# Patient Record
Sex: MALE | Race: BLACK OR AFRICAN AMERICAN | NOT HISPANIC OR LATINO | Employment: OTHER | URBAN - METROPOLITAN AREA
[De-identification: names, ages, dates, MRNs, and addresses within clinical notes are randomized per-mention and may not be internally consistent; named-entity substitution may affect disease eponyms.]

---

## 2023-12-14 ENCOUNTER — OFFICE VISIT (OUTPATIENT)
Dept: PRIMARY CARE | Facility: CLINIC | Age: 88
End: 2023-12-14
Payer: MEDICARE

## 2023-12-14 ENCOUNTER — ANCILLARY PROCEDURE (OUTPATIENT)
Dept: RADIOLOGY | Facility: CLINIC | Age: 88
End: 2023-12-14
Payer: MEDICARE

## 2023-12-14 VITALS
BODY MASS INDEX: 23.05 KG/M2 | OXYGEN SATURATION: 96 % | DIASTOLIC BLOOD PRESSURE: 74 MMHG | HEART RATE: 80 BPM | WEIGHT: 161 LBS | HEIGHT: 70 IN | SYSTOLIC BLOOD PRESSURE: 132 MMHG

## 2023-12-14 DIAGNOSIS — G30.1 LATE ONSET ALZHEIMER'S DEMENTIA WITHOUT BEHAVIORAL DISTURBANCE (MULTI): ICD-10-CM

## 2023-12-14 DIAGNOSIS — F02.80 LATE ONSET ALZHEIMER'S DEMENTIA WITHOUT BEHAVIORAL DISTURBANCE (MULTI): ICD-10-CM

## 2023-12-14 DIAGNOSIS — H61.23 IMPACTED CERUMEN OF BOTH EARS: ICD-10-CM

## 2023-12-14 DIAGNOSIS — I10 HYPERTENSION, UNSPECIFIED TYPE: ICD-10-CM

## 2023-12-14 DIAGNOSIS — Z74.2: ICD-10-CM

## 2023-12-14 DIAGNOSIS — J18.9 PNEUMONIA DUE TO INFECTIOUS ORGANISM, UNSPECIFIED LATERALITY, UNSPECIFIED PART OF LUNG: ICD-10-CM

## 2023-12-14 DIAGNOSIS — Z74.2 ASSISTANCE NEEDED AT HOME: ICD-10-CM

## 2023-12-14 DIAGNOSIS — Z00.00 MEDICARE ANNUAL WELLNESS VISIT, SUBSEQUENT: Primary | Chronic | ICD-10-CM

## 2023-12-14 DIAGNOSIS — R13.10 DYSPHAGIA, UNSPECIFIED TYPE: ICD-10-CM

## 2023-12-14 DIAGNOSIS — R09.82 POST-NASAL DRIP: ICD-10-CM

## 2023-12-14 PROCEDURE — 1160F RVW MEDS BY RX/DR IN RCRD: CPT | Performed by: INTERNAL MEDICINE

## 2023-12-14 PROCEDURE — 71046 X-RAY EXAM CHEST 2 VIEWS: CPT | Performed by: STUDENT IN AN ORGANIZED HEALTH CARE EDUCATION/TRAINING PROGRAM

## 2023-12-14 PROCEDURE — 1036F TOBACCO NON-USER: CPT | Performed by: INTERNAL MEDICINE

## 2023-12-14 PROCEDURE — 1126F AMNT PAIN NOTED NONE PRSNT: CPT | Performed by: INTERNAL MEDICINE

## 2023-12-14 PROCEDURE — 1159F MED LIST DOCD IN RCRD: CPT | Performed by: INTERNAL MEDICINE

## 2023-12-14 PROCEDURE — 1170F FXNL STATUS ASSESSED: CPT | Performed by: INTERNAL MEDICINE

## 2023-12-14 PROCEDURE — G0439 PPPS, SUBSEQ VISIT: HCPCS | Performed by: INTERNAL MEDICINE

## 2023-12-14 PROCEDURE — 3078F DIAST BP <80 MM HG: CPT | Performed by: INTERNAL MEDICINE

## 2023-12-14 PROCEDURE — 3075F SYST BP GE 130 - 139MM HG: CPT | Performed by: INTERNAL MEDICINE

## 2023-12-14 PROCEDURE — 71046 X-RAY EXAM CHEST 2 VIEWS: CPT | Mod: FY

## 2023-12-14 RX ORDER — AMLODIPINE BESYLATE 5 MG/1
5 TABLET ORAL DAILY
Qty: 90 TABLET | Refills: 3 | Status: SHIPPED | OUTPATIENT
Start: 2023-12-14 | End: 2024-06-11

## 2023-12-14 RX ORDER — FLUTICASONE PROPIONATE 50 MCG
1 SPRAY, SUSPENSION (ML) NASAL DAILY
Qty: 16 G | Refills: 5 | Status: SHIPPED | OUTPATIENT
Start: 2023-12-14 | End: 2024-12-13

## 2023-12-14 ASSESSMENT — ACTIVITIES OF DAILY LIVING (ADL)
DRESSING: INDEPENDENT
MANAGING_FINANCES: NEEDS ASSISTANCE
TAKING_MEDICATION: NEEDS ASSISTANCE
BATHING: INDEPENDENT
DOING_HOUSEWORK: NEEDS ASSISTANCE
GROCERY_SHOPPING: NEEDS ASSISTANCE

## 2023-12-14 ASSESSMENT — ENCOUNTER SYMPTOMS: CONSTITUTIONAL NEGATIVE: 1

## 2023-12-14 ASSESSMENT — PATIENT HEALTH QUESTIONNAIRE - PHQ9
1. LITTLE INTEREST OR PLEASURE IN DOING THINGS: NOT AT ALL
SUM OF ALL RESPONSES TO PHQ9 QUESTIONS 1 AND 2: 0
2. FEELING DOWN, DEPRESSED OR HOPELESS: NOT AT ALL

## 2023-12-14 NOTE — PROGRESS NOTES
"Subjective   Reason for Visit: Tom Rodriguez is an 88 y.o. male here for a Medicare Wellness visit.     Past Medical, Surgical, and Family History reviewed and updated in chart.    Reviewed all medications by prescribing practitioner or clinical pharmacist (such as prescriptions, OTCs, herbal therapies and supplements) and documented in the medical record.  HPI      Patient recently seen in the emergency department for cough  Noted to have pneumonia prescribed antibiotic he finished it      He is having a significant amount of postnasal drip and cough  It appears to me that he is congested with the nasal congestion and stuffiness      He also needs assistance at home daughter wants a  referral for home health aide      He is also having difficulty hearing he is having impaction of both ears with solid vacs no pain      Patient Care Team:  Chioma Correa MD as PCP - General (Internal Medicine)  Chioma Correa MD as PCP - [employer] PCP     Review of Systems   Constitutional: Negative.    Gastrointestinal:         DIFFICULTY SWALLOWING   MORE OF A POST NASAL DRIP    All other systems reviewed and are negative.      Objective   Vitals:  /74   Pulse 80   Ht 1.765 m (5' 9.5\")   Wt 73 kg (161 lb)   SpO2 96%   BMI 23.43 kg/m²       Physical Exam  Vitals and nursing note reviewed.   Constitutional:       Comments: THICK MUCUS AT THE BACK   OF THE THROAT    HENT:      Ears:      Comments: IMPACTED CERUMEN BOTH EARS      Mouth/Throat:      Comments: THICK MUCUS AT THE BACK OF THE THROAT         Assessment/Plan   Problem List Items Addressed This Visit       Late onset Alzheimer's dementia without behavioral disturbance (CMS/[facility])    Current Assessment & Plan     STABLE         Impacted cerumen of both ears    Relevant Orders    Referral to ENT     Other Visit Diagnoses       Medicare annual wellness visit, subsequent  (Chronic)   -  Primary    Dysphagia, unspecified type     "    Relevant Orders    FL GI esophagram    Post-nasal drip        Relevant Medications    fluticasone (Flonase) 50 mcg/actuation nasal spray    Pneumonia due to infectious organism, unspecified laterality, unspecified part of lung        Relevant Orders    XR chest 2 views    Hypertension, unspecified type        Relevant Medications    amLODIPine (Norvasc) 5 mg tablet    No assistance at home        Relevant Orders    Referral to Social Work    Assistance needed at home        Relevant Orders    Referral to Social Work                 A/P       WARM SALT WATER GURGLE   ADEQUATE AMOUNT OF FLUID   FLONASE NASAL SPRAY   BARIUM SWALLOW ESOPHAGUS  REFFERAL ENT   REFFERAL    AMLODIPINE 5MG 1 PILL EVERY DAY FILLED   CHEST X-RAY TO REPEAT IN 1 MONTH TIME

## 2024-01-05 ENCOUNTER — PATIENT OUTREACH (OUTPATIENT)
Dept: CARE COORDINATION | Facility: CLINIC | Age: 89
End: 2024-01-05
Payer: MEDICARE

## 2024-01-05 ENCOUNTER — DOCUMENTATION (OUTPATIENT)
Dept: CARE COORDINATION | Facility: CLINIC | Age: 89
End: 2024-01-05
Payer: MEDICARE

## 2024-01-05 SDOH — ECONOMIC STABILITY: FOOD INSECURITY: WITHIN THE PAST 12 MONTHS, YOU WORRIED THAT YOUR FOOD WOULD RUN OUT BEFORE YOU GOT MONEY TO BUY MORE.: SOMETIMES TRUE

## 2024-01-05 SDOH — ECONOMIC STABILITY: GENERAL: WOULD YOU LIKE HELP WITH ANY OF THE FOLLOWING NEEDS?: FOOD INSECURITY;OTHER

## 2024-01-05 ASSESSMENT — SOCIAL DETERMINANTS OF HEALTH (SDOH)
HOW OFTEN DO YOU GET TOGETHER WITH FRIENDS OR RELATIVES?: TWICE A WEEK
IN A TYPICAL WEEK, HOW MANY TIMES DO YOU TALK ON THE PHONE WITH FAMILY, FRIENDS, OR NEIGHBORS?: TWICE A WEEK

## 2024-01-05 NOTE — PROGRESS NOTES
Referral sent via voicemail to [employer] under [employer] for help with ADLS and IADLS.  Contact is daughter/[place].  Cande Gutierrez MSW LSW   1/5/24.

## 2024-01-05 NOTE — CARE PLAN
This  was referred this patient due to patient needing assistance in the home.  Contact is daughter/Corinne @ [phone].  Message has been left for Corinne by this  today.  Cande Gutierrez MSW LSW   1/5/24

## 2024-01-05 NOTE — CARE PLAN
This  has spoken to the daughter of the patient/Corinne.   Corinne has explained to this  that the patient needs assistance in his home/adls and ialds.  Family is helping with cooking, cleaning, and errands. Patient uses a cane, has limited hearing and eye sight.  Patient does live alone, but has family support.  We discussed:  [employer]/PASSPORT Program/Corinne has agreed to referral  [facility]/at this time, we are not referring/but it can be an option.   Care Plan is place  Cande Gutierrez MSW LSW   1/5/24   Problem: Coordination of Community Resources Needed  Goal: Coordination of Services will be Obtained  Outcome: Progressing

## 2024-01-31 ENCOUNTER — TELEPHONE (OUTPATIENT)
Dept: PRIMARY CARE | Facility: CLINIC | Age: 89
End: 2024-01-31
Payer: MEDICARE

## 2024-01-31 NOTE — TELEPHONE ENCOUNTER
Shanthi from [facility] is asking for the physician's statement form that they have sent over. She can be reached at [phone]

## 2024-02-01 ENCOUNTER — APPOINTMENT (OUTPATIENT)
Dept: RADIOLOGY | Facility: HOSPITAL | Age: 89
End: 2024-02-01
Payer: MEDICARE

## 2024-02-01 ENCOUNTER — PATIENT OUTREACH (OUTPATIENT)
Dept: CARE COORDINATION | Facility: CLINIC | Age: 89
End: 2024-02-01
Payer: MEDICARE

## 2024-02-01 NOTE — CARE PLAN
This  has spoken to Corinne/Daughter of patient/Tom Rodriguez to obtain an update on PASSPORT Services.  Corinne informed this  that patient was assessed last Monday for [employer]port Services. Corinne would like an update from [facility]/PASSPORT on patient's status.  At this time, this  has called [employer] and left a message on their status line for services and asked them to contact myself and the patient's daughter for updates.  Cande Gutierrez MSW LSW   2/1/24

## 2024-02-02 ENCOUNTER — DOCUMENTATION (OUTPATIENT)
Dept: CARE COORDINATION | Facility: CLINIC | Age: 89
End: 2024-02-02
Payer: MEDICARE

## 2024-02-02 NOTE — PROGRESS NOTES
This  received a message from [employer] [facility]. Rosanne Mohr works for [employer]SP[employer] and informed this  that the she has spoken to patient's daughter and updated her.  The updates include:  Patient was assessed on 1/22/24 for PASSPORT  Patient was applied to Medicaid with local [employer] on 1/31/24; it takes 30-45 days to process.    At this time, patient is in process with Medicaid and awaiting an approval for the waiver program.    Cande Gutierrez MSW LSW   2/2/24.

## 2024-03-12 ENCOUNTER — PATIENT OUTREACH (OUTPATIENT)
Dept: CARE COORDINATION | Facility: CLINIC | Age: 89
End: 2024-03-12
Payer: MEDICARE

## 2024-03-12 NOTE — CARE PLAN
This SW left message for [place]/dtr of patient for an update re: assessment/[employer].  At this time, this SW is waiting for a return call.  Cande Gutierrez MSW LSW  3/12/24

## 2024-03-13 ENCOUNTER — DOCUMENTATION (OUTPATIENT)
Dept: CARE COORDINATION | Facility: CLINIC | Age: 89
End: 2024-03-13
Payer: MEDICARE

## 2024-03-13 NOTE — PROGRESS NOTES
This SW spoke with Dtr of patient today.  Dtr has not heard from [employer] for her father/the patient.  This SW will call [employer] and ask about patient's application.  Cande Gutierrez MSW LSW   3/13/24.

## 2024-03-14 ENCOUNTER — DOCUMENTATION (OUTPATIENT)
Dept: CARE COORDINATION | Facility: CLINIC | Age: 89
End: 2024-03-14
Payer: MEDICARE

## 2024-03-14 NOTE — PROGRESS NOTES
This  attempted to reach [employer] and leave a message for an update regarding the status of the patient's application for [employer]SPORT. Their mailbox is full at [place]. This  then sent an email message through their website asking for an update re: Patient's status.   This  is waiting for a response. NOTE: This SW did ask [facility] to contact patient's dtr too.  Cande Gutierrez MSW LSW   3/14/24

## 2024-04-03 ENCOUNTER — PATIENT OUTREACH (OUTPATIENT)
Dept: CARE COORDINATION | Facility: CLINIC | Age: 89
End: 2024-04-03
Payer: MEDICARE

## 2024-04-03 NOTE — CARE PLAN
This SW left a message for Corinne/Pt's daughter asking if she heard from [employer].  Cande Gutierrez MSW LSW  4/3/24

## 2024-05-03 ENCOUNTER — PATIENT OUTREACH (OUTPATIENT)
Dept: CARE COORDINATION | Facility: CLINIC | Age: 89
End: 2024-05-03
Payer: MEDICARE

## 2024-05-03 NOTE — CARE PLAN
SW spoke with Pt's dtr/Corinne.  Corinne has not heard from [employer].  SW has called [employer](LEFT MESSAGE) and sent another referral web today too.  Cande Gutierrez MSW LSW

## 2024-05-10 ENCOUNTER — DOCUMENTATION (OUTPATIENT)
Dept: CARE COORDINATION | Facility: CLINIC | Age: 89
End: 2024-05-10
Payer: MEDICARE

## 2024-05-10 NOTE — PROGRESS NOTES
"Dtr/Corinne called and asked about Passport. SW never heard back from [employer]. SW did attempt again on the phone with [employer]/hold was long .  Email was sent to: Kenneth Brown at [employer]\"Hi,  A patient was referred for PASSPORT SERVICES: Tom Rodriguez.  Corinne is his daughter.  I have been trying to attempt to obtain the status of this Pt's application. Pt's daughter really needs help with her father.   I also completed an online trino for this service recently. I would like to know if someone from your agency can reach out to the daughter?  I have also attempted to wait online on your agency's number. I could not wait a long time.       ADDRESS OF Patient.    [address] [place] [address] [address] [place] [address]     35 is the   [place]'s number is: [phone].     Please let me know if you can assist.     Thank you.\"    DTR updated on the above email.  Cande Gutierrez MSW LSW     "

## 2024-05-28 ENCOUNTER — PATIENT OUTREACH (OUTPATIENT)
Dept: CARE COORDINATION | Facility: CLINIC | Age: 89
End: 2024-05-28
Payer: MEDICARE

## 2024-05-28 NOTE — PROGRESS NOTES
SW spoke with Corinne the daughter. Pt will be assessed this week for PASSPORT Services.   If Pt needs more help, Dtr has been explained to ask for [employer] Team.  Cande Gutierrez MSW LSW

## 2024-12-19 ENCOUNTER — APPOINTMENT (OUTPATIENT)
Dept: PRIMARY CARE | Facility: CLINIC | Age: 89
End: 2024-12-19

## 2025-01-17 ENCOUNTER — APPOINTMENT (OUTPATIENT)
Dept: PRIMARY CARE | Facility: CLINIC | Age: OVER 89
End: 2025-01-17
Payer: MEDICARE

## 2025-06-02 ENCOUNTER — APPOINTMENT (OUTPATIENT)
Dept: PRIMARY CARE | Facility: CLINIC | Age: OVER 89
End: 2025-06-02
Payer: MEDICARE

## 2025-07-21 ENCOUNTER — APPOINTMENT (OUTPATIENT)
Dept: OPHTHALMOLOGY | Facility: CLINIC | Age: OVER 89
End: 2025-07-21
Payer: MEDICARE